# Patient Record
Sex: MALE | Race: WHITE | NOT HISPANIC OR LATINO
[De-identification: names, ages, dates, MRNs, and addresses within clinical notes are randomized per-mention and may not be internally consistent; named-entity substitution may affect disease eponyms.]

---

## 2024-02-20 ENCOUNTER — NON-APPOINTMENT (OUTPATIENT)
Age: 44
End: 2024-02-20

## 2024-04-28 ENCOUNTER — NON-APPOINTMENT (OUTPATIENT)
Age: 44
End: 2024-04-28

## 2024-05-16 PROBLEM — Z00.00 ENCOUNTER FOR PREVENTIVE HEALTH EXAMINATION: Status: ACTIVE | Noted: 2024-05-16

## 2024-05-29 ENCOUNTER — APPOINTMENT (OUTPATIENT)
Dept: ORTHOPEDIC SURGERY | Facility: CLINIC | Age: 44
End: 2024-05-29
Payer: COMMERCIAL

## 2024-05-29 ENCOUNTER — RESULT CHARGE (OUTPATIENT)
Age: 44
End: 2024-05-29

## 2024-05-29 DIAGNOSIS — M23.92 UNSPECIFIED INTERNAL DERANGEMENT OF LEFT KNEE: ICD-10-CM

## 2024-05-29 DIAGNOSIS — M75.52 BURSITIS OF LEFT SHOULDER: ICD-10-CM

## 2024-05-29 DIAGNOSIS — M85.89 OTHER SPECIFIED DISORDERS OF BONE DENSITY AND STRUCTURE, MULTIPLE SITES: ICD-10-CM

## 2024-05-29 DIAGNOSIS — M75.51 BURSITIS OF RIGHT SHOULDER: ICD-10-CM

## 2024-05-29 PROCEDURE — 99213 OFFICE O/P EST LOW 20 MIN: CPT

## 2024-05-29 PROCEDURE — 73560 X-RAY EXAM OF KNEE 1 OR 2: CPT | Mod: LT

## 2024-05-29 PROCEDURE — 73030 X-RAY EXAM OF SHOULDER: CPT | Mod: 50

## 2024-05-29 NOTE — DISCUSSION/SUMMARY
[de-identified] : PATIENT IS HERE FOR BOTH SHOULDERS WITH THE LEFT SIDE BEING WORSE THAN THE RIGHT AND HIS LEFT KNEE.  HE DID HAVE SUPARTZ INJECTION IN A DIFFERENT ORTHOPEDIC PRACTICE SAID WAS FOR ARTHRITIS AND THEY DID DO AN MRI BUT WE DO NOT HAVE THAT RESULT.  HE RECEIVED 5 SHOTS AND HE DOES FEEL BETTER BUT HE SEEKS SOME REASSURANCE BECAUSE THE OTHER PRACTICE SAID THAT IF THIS DID NOT WORK THEN THE NECK STEP WOULD BE SURGERY..  HIS OTHER MAIN PROBLEM IS HIS LEFT SHOULDER.  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  HE LIKES TO BOWL BUT HAS HAD DIFFICULTY DOING SO AT THE HIGH LEVEL THAT HE USUALLY BOWLS AT BECAUSE WHEN HE HAS TO DO AND ON HIS LEFT FOOT HIS KNEE HURTS.  HE DOES NOT SMOKE.  HE IS TAKING MELOXICAM WHICH WAS FOR HIS KNEE.  X-RAYS OF BOTH SHOULDERS TAKEN IN OUR OFFICE TODAY 3 VIEWS SHOW CHANGES CONSISTENT WITH A DOWNSLOPING ACROMION AND BEING ON THE CONTINUING AND SPECTRUM OF ROTATOR CUFF PATHOLOGY..  X-RAYS TAKEN TODAY IN OUR OFFICE STANDING OF HIS LEFT KNEE DO NOT SHOW SIGNIFICANT DJD OR OTHER LESIONS.  WE SPENT A LONG TIME EXPLAINING HIS PROBLEMS TO HIM AND THAT IF HIS LEFT KNEE DID NOT IMPROVE ENOUGH AND IF OTHER ANTI-INFLAMMATORIES DID NOT WORK OR OTHER INJECTIONS DID NOT WORK DEPENDING ON MRI FINDINGS HE MIGHT BENEFIT FROM AN ARTHROSCOPY BUT NO MAJOR SURGERY.  WE FELT THAT THIS SHOULD REASSURE HIM AND VOCALIZE THAT.  HE DID NOT REALLY AGREE OR SAID IT DID NOT HELP MUCH.  WE SPENT MORE TIME EXPLAINING THE CONTINUUM OF ROTATOR CUFF PATHOLOGY AND THE CONTINUUM OF TREATMENT TO INCLUDE A DIFFERENT ANTI-INFLAMMATORY AND I WAS GOING TO USE DOLOBID AS WELL AS PRESCRIPTION STRENGTH TYLENOL LIDODERM PATCHES VOLTAREN GEL AND CALCIUM AND VITAMIN D SUPPLEMENTATION.  WE ALSO DISCUSSED PHYSICAL THERAPY FOR HIS SHOULDERS AS WELL AS HIS LEFT KNEE AS WELL AS GENTLE AEROBIC CONDITIONING.  WE DISCUSSED SENDING HIM FOR AN MRI OF HIS LEFT SHOULDER.  HE DID NOT SEEM PLEASED WITH OUR PLAN SO I ASKED HIM WHAT IF ANYTHING HE LIKED ABOUT OUR VISIT.  HE SAID THAT HE DID NOT LIKE ANYTHING AND THAT HE CAME HERE TO BE MADE ALL BETTER AND THAT HE DID NOT DESERVE TO HAVE THESE PROBLEMS.  I EXPLAINED THAT WE TRIED TO GIVE HIM SOME COMFORT THAT HE WOULD NOT NEED MAJOR SURGERY ON HIS KNEE AND THAT WE HAD A PLAN TO MAKE BOTH HIS KNEE AND HIS SHOULDERS BETTER..  I REMINDED HIM THAT IT IS NOT UNREASONABLE EXPECTATION TO COME HERE ONCE AND FOR ME TO STOP MY FINGERS AND MAKE EVERYTHING GOOD RIGHT AWAY.  I PRESCRIBED EVERYTHING ON OUR DISCHARGE SHE BUT WHEN HE CAME UP TO OUR MEDICAL ASSISTANTS FOR DISCHARGE HE SAID THAT SHE SHOULD HOLD EVERYTHING INCLUDING THE MRI AS THE THERAPIES AND THE MEDICINES.  I WAS IN THE ADJOINING OFFICE WITH THE DOOR CLOSED BUT I CLEARLY HEARD BACK AND SHE CONFIRMS THE SAME. I CALLED HIM WITH THE GOAL OF THE ASKING HIM IF HE WERE DEPRESSED AND OTHERWISE MIGHT NEED REFERRAL FOR THAT BUT HE SAID THAT HE FELT THAT HE WAS "PLAYED" AND THAT HE DID NOT WANT TO SPEAK NOW AND THEN HE PUT ME ON A PERMANENT HOLD WITH SOME MUSIC IN THE BACKGROUND. AS REGARDS HIM BEING "PLAYED, "HE GOT HERE AN HOUR EARLIER THAN HIS APPOINTMENT AND WE DID REGISTER HIM AND SEND HIM FOR X-RAYS AND SEE HIM AS QUICKLY AS POSSIBLE AND WELL BEFORE HIS APPOINTMENT TIME.  HE DOES NOT DEMONSTRATE ANY VIOLENT TENDENCIES OR TENDENCY TO HURT HIMSELF HE IS WALKING STABLY AND IS ALERT AND ORIENTED X 3 WITHOUT ANY EVIDENCE OF ALCOHOL USE OR SUBSTANCE USE WE ARE NOT CAN OTHERWISE REFER HIM TO MENTAL HEALTH OR OTHER AUTHORITIES WITHOUT HIS PERMISSION AS HE GIVES NO INDICATION OF BEING A DANGER TO HIMSELF OR OTHERS.  WE HAD GIVEN HIM A 2-MONTH FOLLOW-UP AND IF HE CHOOSES TO FOLLOW-UP WE WILL BE GLAD TO SEE HIM AND IF HE CALLS IN AND HE WANTS THE TREATMENTS PRESCRIBED WE WILL DO SO.  OVERALL PHYSICAL EXAM GENERAL: Well developed well nourished in no acute distress   MENTAL:Alert and oriented x3, normal affect, Pleasant and accompanied by family   MUSCULOSKELETAL: Ambulating independently   HEENT: NCAT, EOM intact, mucosa moist, neck supple with adequate free rom   CARDIOVASCULAR/HEMATOLOGICAL: no JVD, no peripheral edema, good capillary refill,  skin warm and well perfused, no venous stasis ulcers, pulses intact, no pallor or superficial non-traumatic bruising   NEUROLOGICAL: free passive rom without rigidity or cog wheel motion   RESPIRATORY: no gross stridor or wheezing or increased respiratory effort or use of O2, good capil refill and color   INTEGUMENTARY: skin intact without obvious suspicious lesions where examined Bilateral SHOULDER EXAM No  postural asymmetry, no scapular winging, NVI   ROM: abduction>90; FF>90, IR to opposite shoulder, ER to back of head, adduction 30 but with discomfort at terminal range of motion  Impingement:  positive NEER with forearm in pronation and supination, positive Tian-Salas, positive  empty can test for pain by anterior and lateral aspect of shoulder near acromion and AC joint on affected side and virtually negative on contralateral side    Speed and [place] and subscapular lift off test positive for discomfort but they are able to do the motions against mild resistance location:      Left KNEE EXAM There range of motion is preserved.  They are slightly uncomfortable at the patellofemoral joint with compression and with resisted straight leg raising.  There is a very mild effusion and they do have joint line tenderness medial greater than lateral.  Anterior and posterior drawer are negative.  The knee is stable to varus valgus stress there neurovascular intact without signs or symptoms of DVT

## 2024-06-10 ENCOUNTER — APPOINTMENT (OUTPATIENT)
Dept: PULMONOLOGY | Facility: CLINIC | Age: 44
End: 2024-06-10
Payer: COMMERCIAL

## 2024-06-10 VITALS
BODY MASS INDEX: 35.79 KG/M2 | DIASTOLIC BLOOD PRESSURE: 80 MMHG | SYSTOLIC BLOOD PRESSURE: 120 MMHG | WEIGHT: 250 LBS | HEART RATE: 88 BPM | OXYGEN SATURATION: 96 % | HEIGHT: 70 IN

## 2024-06-10 DIAGNOSIS — G47.36 SLEEP RELATED HYPOVENTILATION IN CONDITIONS CLASSIFIED ELSEWHERE: ICD-10-CM

## 2024-06-10 DIAGNOSIS — G47.30 HYPERSOMNIA, UNSPECIFIED: ICD-10-CM

## 2024-06-10 DIAGNOSIS — G47.10 HYPERSOMNIA, UNSPECIFIED: ICD-10-CM

## 2024-06-10 PROCEDURE — 99203 OFFICE O/P NEW LOW 30 MIN: CPT

## 2024-06-10 PROCEDURE — G2211 COMPLEX E/M VISIT ADD ON: CPT | Mod: NC

## 2024-06-10 RX ORDER — PNV NO.95/FERROUS FUM/FOLIC AC 28MG-0.8MG
100 TABLET ORAL
Refills: 0 | Status: ACTIVE | COMMUNITY

## 2024-06-10 RX ORDER — UBIDECARENONE/VIT E ACET 100MG-5
CAPSULE ORAL
Refills: 0 | Status: ACTIVE | COMMUNITY

## 2024-06-10 RX ORDER — MONTELUKAST 10 MG/1
10 TABLET, FILM COATED ORAL
Refills: 0 | Status: ACTIVE | COMMUNITY

## 2024-06-10 NOTE — ASSESSMENT
[FreeTextEntry1] : Assessment: - Summary : Based on the patient's reported symptoms and physical examination findings, the provider suspects obstructive sleep apnea. - Problems : - Suspected obstructive sleep apnea  - Differential Diagnosis : - Obstructive sleep apnea is the most likely diagnosis based on the patient's symptoms and physical examination findings.  Plan: - Summary : The plan is to order a home sleep study to confirm the diagnosis of obstructive sleep apnea and initiate treatment with continuous positive airway pressure (CPAP) therapy if indicated. - Plan : - Order a home sleep study to document the presence and severity of sleep apnea.  - If sleep apnea is confirmed, prescribe a CPAP machine and provide education on its use and importance.  - Schedule a follow-up appointment within 90 days to assess treatment compliance and effectiveness.  - Emphasize the importance of using the CPAP machine for at least 4 hours per night, preferably 6-7 hours, to achieve optimal health benefits.  - Inform the patient that insurance coverage may be contingent on treatment compliance and follow-up. 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

## 2024-06-10 NOTE — HISTORY OF PRESENT ILLNESS
[TextBox_4] : Subjective: - Summary : Mr. Castellon is a patient presenting with concerns about his sleep quality and potential sleep apnea. - Chief Complaint (CC) : Evaluation of sleep quality and potential sleep apnea. - History of Present Illness (HPI) : Chief Complaint: Evaluation of sleep quality and potential sleep apnea.  Characterization of the Discomfort: Frequent bathroom trips during the night, feeling unrefreshed upon waking, occasional daytime sleepiness. Associated Signs and Symptoms: Snoring (reported by others), daytime sleepiness. Temporal Factors: Symptoms occur during sleep and upon waking. Associated Symptoms: Frequent bathroom trips during the night. Severity and Impact: Moderate impact on sleep quality and daytime functioning... .

## 2024-06-10 NOTE — PHYSICAL EXAM
[No Acute Distress] : no acute distress [Normal Appearance] : normal appearance [No Neck Mass] : no neck mass [Normal Rate/Rhythm] : normal rate/rhythm [Normal S1, S2] : normal s1, s2 [No Murmurs] : no murmurs [No Resp Distress] : no resp distress [Clear to Auscultation Bilaterally] : clear to auscultation bilaterally [No Abnormalities] : no abnormalities [Benign] : benign [Normal Gait] : normal gait [No Clubbing] : no clubbing [No Cyanosis] : no cyanosis [No Edema] : no edema [FROM] : FROM [No Focal Deficits] : no focal deficits [Normal Color/ Pigmentation] : normal color/ pigmentation [Oriented x3] : oriented x3 [Normal Affect] : normal affect

## 2024-06-20 ENCOUNTER — APPOINTMENT (OUTPATIENT)
Dept: SLEEP CENTER | Facility: HOSPITAL | Age: 44
End: 2024-06-20
Payer: COMMERCIAL

## 2024-06-20 ENCOUNTER — OUTPATIENT (OUTPATIENT)
Dept: OUTPATIENT SERVICES | Facility: HOSPITAL | Age: 44
LOS: 1 days | Discharge: HOME | End: 2024-06-20
Payer: COMMERCIAL

## 2024-06-20 DIAGNOSIS — G47.33 OBSTRUCTIVE SLEEP APNEA (ADULT) (PEDIATRIC): ICD-10-CM

## 2024-06-20 PROCEDURE — 95800 SLP STDY UNATTENDED: CPT | Mod: 26

## 2024-06-20 PROCEDURE — 95800 SLP STDY UNATTENDED: CPT

## 2024-06-25 DIAGNOSIS — G47.33 OBSTRUCTIVE SLEEP APNEA (ADULT) (PEDIATRIC): ICD-10-CM

## 2024-07-28 ENCOUNTER — NON-APPOINTMENT (OUTPATIENT)
Age: 44
End: 2024-07-28

## 2024-08-22 ENCOUNTER — APPOINTMENT (OUTPATIENT)
Dept: SLEEP CENTER | Facility: HOSPITAL | Age: 44
End: 2024-08-22

## 2024-08-22 PROCEDURE — 95811 POLYSOM 6/>YRS CPAP 4/> PARM: CPT | Mod: 26

## 2024-09-01 ENCOUNTER — EMERGENCY (EMERGENCY)
Facility: HOSPITAL | Age: 44
LOS: 0 days | Discharge: HOME | End: 2024-09-01
Attending: EMERGENCY MEDICINE
Payer: COMMERCIAL

## 2024-09-01 VITALS
WEIGHT: 220.02 LBS | DIASTOLIC BLOOD PRESSURE: 78 MMHG | SYSTOLIC BLOOD PRESSURE: 108 MMHG | HEART RATE: 82 BPM | RESPIRATION RATE: 18 BRPM | TEMPERATURE: 98 F | OXYGEN SATURATION: 98 %

## 2024-09-01 DIAGNOSIS — G89.29 OTHER CHRONIC PAIN: ICD-10-CM

## 2024-09-01 DIAGNOSIS — M25.562 PAIN IN LEFT KNEE: ICD-10-CM

## 2024-09-01 PROCEDURE — 99283 EMERGENCY DEPT VISIT LOW MDM: CPT

## 2024-09-01 PROCEDURE — 99284 EMERGENCY DEPT VISIT MOD MDM: CPT

## 2024-09-01 RX ORDER — DEXAMETHASONE 0.75 MG
10 TABLET ORAL ONCE
Refills: 0 | Status: COMPLETED | OUTPATIENT
Start: 2024-09-01 | End: 2024-09-01

## 2024-09-01 RX ORDER — OXYCODONE AND ACETAMINOPHEN 7.5; 325 MG/1; MG/1
1 TABLET ORAL
Qty: 16 | Refills: 0
Start: 2024-09-01 | End: 2024-09-04

## 2024-09-01 RX ORDER — IBUPROFEN 600 MG
600 TABLET ORAL ONCE
Refills: 0 | Status: COMPLETED | OUTPATIENT
Start: 2024-09-01 | End: 2024-09-01

## 2024-09-01 RX ADMIN — Medication 600 MILLIGRAM(S): at 18:11

## 2024-09-01 RX ADMIN — Medication 10 MILLIGRAM(S): at 18:11

## 2024-09-01 NOTE — ED PROVIDER NOTE - CLINICAL SUMMARY MEDICAL DECISION MAKING FREE TEXT BOX
Agree with above history exam explained that patient has no evidence of DVT on exam no evidence of infection and x-ray  likely to have any utility.  Given Percocet as breakthrough pain additional single dose of Decadron here stable for discharge outpatient follow-up.

## 2024-09-01 NOTE — ED PROVIDER NOTE - NSFOLLOWUPINSTRUCTIONS_ED_ALL_ED_FT
Our Emergency Department Referral Coordinators will be reaching out to you in the next 24-48 hours from 9:00am to 5:00pm with a follow up appointment. Please expect a phone call from the hospital in that time frame. If you do not receive a call or if you have any questions or concerns, you can reach them at   [phone]

## 2024-09-01 NOTE — ED ADULT TRIAGE NOTE - NS ED NURSE DIRECT TO ROOM YN
Refill request for DONEPEZIL HCL 10 MG Tablet,AMLODIPINE BESYLATE 10 MG Tablet   medication.      Name of [facility] visit - 7-     Pending visit - 1-    Last refill - 8-, 9-      Medication Contract signed -   Michael Tapia ran-         Additional Comments Yes

## 2024-09-01 NOTE — ED PROVIDER NOTE - OBJECTIVE STATEMENT
Patient is a 44-year-old male here for evaluation of atraumatic left knee pain over the past couple days status post embolization procedure on left knee done at [facility] 3 days ago.  Patient denies redness, swelling, fever, chills, trauma, weakness, numbness.  Patient states that he has chronic left knee pain and this feels similar to previous episodes,

## 2024-09-01 NOTE — ED PROVIDER NOTE - PATIENT PORTAL LINK FT
no You can access the FollowMyHealth Patient Portal offered by [facility] by registering at the following website: [email or web address]. By joining [facility]’s FollowMyHealth portal, you will also be able to view your health information using other applications (apps) compatible with our system.

## 2024-09-01 NOTE — ED ADULT TRIAGE NOTE - CHIEF COMPLAINT QUOTE
Pt complaining of pain in his left knee, says he had a GEA procedure done on friday and pain has worsened today. Denies injury to the knee

## 2024-09-01 NOTE — ED PROVIDER NOTE - PHYSICAL EXAMINATION
VITAL SIGNS: I have reviewed nursing notes and confirm.  CONSTITUTIONAL: Well-developed; well-nourished   SKIN: skin exam is warm and dry, no acute rash.    HEAD: Normocephalic; atraumatic.  EYES: conjunctiva and sclera clear.  ENT: No nasal discharge; airway clear.  EXT: no redness/swelling, warmth to left knee Normal ROM.  No clubbing, cyanosis or edema.   NEURO: Alert, oriented, grossly unremarkable

## 2024-09-20 ENCOUNTER — EMERGENCY (EMERGENCY)
Facility: HOSPITAL | Age: 44
LOS: 0 days | Discharge: HOME | End: 2024-09-20
Attending: EMERGENCY MEDICINE
Payer: COMMERCIAL

## 2024-09-20 VITALS
OXYGEN SATURATION: 99 % | TEMPERATURE: 98 F | WEIGHT: 259.93 LBS | DIASTOLIC BLOOD PRESSURE: 68 MMHG | SYSTOLIC BLOOD PRESSURE: 111 MMHG | HEART RATE: 98 BPM | RESPIRATION RATE: 18 BRPM

## 2024-09-20 DIAGNOSIS — M17.12 UNILATERAL PRIMARY OSTEOARTHRITIS, LEFT KNEE: ICD-10-CM

## 2024-09-20 DIAGNOSIS — M25.562 PAIN IN LEFT KNEE: ICD-10-CM

## 2024-09-20 PROCEDURE — 73562 X-RAY EXAM OF KNEE 3: CPT | Mod: LT

## 2024-09-20 PROCEDURE — 73562 X-RAY EXAM OF KNEE 3: CPT | Mod: 26,LT

## 2024-09-20 PROCEDURE — 99284 EMERGENCY DEPT VISIT MOD MDM: CPT

## 2024-09-20 PROCEDURE — 99283 EMERGENCY DEPT VISIT LOW MDM: CPT | Mod: 25

## 2024-09-20 NOTE — ED PROVIDER NOTE - CLINICAL SUMMARY MEDICAL DECISION MAKING FREE TEXT BOX
44-year-old male presents to the ED of left knee pain that started today.  Prior history of genicular artery embolization for left knee arthritis.  Followed up with surgeon who performed the procedure with no issues last week.  Today patient has pain to the left anterior knee.  Mild tenderness to palpation to the anterior portion of the knee.  No obvious skin changes.  5 out of 5 strength with knee flexion extension.  Distal pulses present.  Sensation is intact of the left lower extremity.  No groin site wound or hematoma.  X-ray unremarkable.  Consistent with arthritis/musculoskeletal related pain.  Will follow-up with physician who performed the procedure and primary care.  Discharged home.

## 2024-09-20 NOTE — ED PROVIDER NOTE - CHILD ABUSE FACILITY
[facility]
Introduction Text (Please End With A Colon): The following procedure was deferred:
Detail Level: Simple

## 2024-09-20 NOTE — ED PROVIDER NOTE - MUSCULOSKELETAL, MLM
left knee shows, slight swelling, FROM, no redness, no effusion, ambulating in ED without difficulty

## 2024-09-20 NOTE — ED ADULT TRIAGE NOTE - BIRTH SEX
DIAGNOSTIC HOME SLEEP TEST (HST) REPORT       PATIENT ID:  NAME:  Zuly Christian  MRN:               [id number]  YOB: 1950  DATE OF STUDY: 11/1/18      Impression:     This study shows evidence of:     1.ild obstructive sleep apnea with  Respiratory Event Index (ANKUSH) of 9.7 per hour and worse in supine sleep with ANKUSH at 24. These findings are based on the recording time (flow evaluation time). It is not possible with this device to determine a traditional apnea+hypopnea index (AHI) for total sleep time since EEG channels are not available.     2.  O2 Sat. rangel was 80% and baseline O2 at 96 %. O2 sat was below 89% for 201 min of the flow evaluation time. Avg HR 98 BPM.      TECHNICAL DESCRIPTION:  [employer] Device used was a type-III home study device. Home sleep study recording included: Airflow recording by nasal pressure transducer; Respiratory Effort by abdominal Respiratory Bands; O2 by finger oximetry. A position sensor and a snore channel was also used.    Scoring Criteria: A modification of the the AASM Manual for the Scoring of Sleep and Associated Events, 2012, was used.   Obstructive apnea was scored by cessation of airflow for at least 10 seconds with continuing respiratory effort.  Central apnea was scored by cessation of airflow for at least 10 seconds with no effort.  Hypopnea was scored by a 30% or more reduction in airflow for at least 10 seconds accompanied by an arterial oxygen desaturation of 3% or more.  (For Medicare patients, hypopneas were scored by a 30% or more reduction in airflow for at least 10 seconds accompanied by an arterial oxygen saturation of 4% or more, as required by their insurance, CMS.        General sleep summary: . Total recording time is 577 minutes and total flow evaluation time is 572 minutes. The patient spent 167 minutes in the supine position and 405 minutes in the nonsupine position.    Respiratory events:    Apneas: 33 (Obstructive apnea index  2.9/hr, Central apnea index 0.4 /hr, mixed 0.1 /hour)  Hypopneas: 60    Recommendations:    1. CPAP titration study vs Auto CPAP trial .   2.   In general patients with sleep apnea are advised to avoid alcohol and sedatives and to not operate a motor vehicle while drowsy. In some cases alternative treatment options may prove effective in resolving sleep apnea in these options include upper airway surgery, the use of a dental orthotic or weight loss and positional therapy. Clinical correlation is required.         Rodríguez Bustamante MD                                                   Male

## 2024-09-20 NOTE — ED PROVIDER NOTE - OBJECTIVE STATEMENT
patient c/o left knee pain past 2 days, no trauma, H/o GRA to left knee for arthritis in the past, Came for eval, no trauma

## 2024-09-20 NOTE — ED ADULT NURSE NOTE - NSFALLUNIVINTERV_ED_ALL_ED
Bed/Stretcher in lowest position, wheels locked, appropriate side rails in place/Call bell, personal items and telephone in reach/Instruct patient to call for assistance before getting out of bed/chair/stretcher/Non-slip footwear applied when patient is off stretcher/Divide to call system/Physically safe environment - no spills, clutter or unnecessary equipment/Purposeful proactive rounding/Room/bathroom lighting operational, light cord in reach

## 2024-09-20 NOTE — ED PROVIDER NOTE - WR INTERPRETATION DATE TIME  1
Aricept      Last Written Prescription Date: 04/14/17  Last Fill Quantity: 90,  # refills: 1   Last Office Visit with G, [facility] or [facility] prescribing provider: 10/14/17                                               
aricept  Last office visit: 10/4/17  Last refill: 4/14/17 #90 refills 1    Thank you.    
20-Sep-2024 16:43

## 2024-10-04 ENCOUNTER — APPOINTMENT (OUTPATIENT)
Dept: ORTHOPEDIC SURGERY | Facility: CLINIC | Age: 44
End: 2024-10-04
Payer: COMMERCIAL

## 2024-10-04 DIAGNOSIS — M23.92 UNSPECIFIED INTERNAL DERANGEMENT OF LEFT KNEE: ICD-10-CM

## 2024-10-04 PROCEDURE — 73560 X-RAY EXAM OF KNEE 1 OR 2: CPT | Mod: 50

## 2024-10-04 PROCEDURE — 99204 OFFICE O/P NEW MOD 45 MIN: CPT

## 2024-10-04 RX ORDER — CELECOXIB 200 MG/1
200 CAPSULE ORAL
Qty: 60 | Refills: 2 | Status: ACTIVE | COMMUNITY
Start: 2024-10-04 | End: 1900-01-01

## 2024-10-04 NOTE — ASSESSMENT
[FreeTextEntry1] : 44-year-old gentleman who has been worked up extensively.  By report and MRI showed mild knee arthritis.  Patient reports persistent anterior lateral and lateral knee pain.  My guess is that he most likely has some distal iliotibial band tendinitis.  He does not report any history of physical therapy.  It is ironic that a patient with knee pain that this would be the last thing that would be addressed.  My recommendation for this patient to be physical therapy focusing on iliotibial band stretching VMO strengthening generalized knee strengthening gait training and balance.  Modalities can utilize adjunct therapy.  I also think that his use of Celebrex is reasonable and probably can be continued.  Short of this is not much I can offer this patient.  I suspect that his expectations are out of proportion to his aspirations.  He had a 70% improvement with the geniculate artery embolization.  He has an MRI which documents early arthritis.  He carries approximately 25 to 30 pounds more weight than is ideal.    Recommendations: Physical therapy to teach the patient a self-directed exercise program.  Physical therapy will focus on terminal extension quadricep and VMO strengthening generalized knee strengthening exercises.  Physical therapy should also work on iliotibial band stretching and hip abductor strengthening.  Therapy can also teach the patient and work with the patient on core strengthening which will help with his generalized conditioning.  Modalities will be used as an adjunct but not a focus of therapy.  The second thing I would recommend would be continued use of nonsteroidal anti-inflammatory medication such as Celebrex.  Am happy to provide him with a new prescription today and then going forward he can get further prescriptions from his medical doctor.  Third I would recommend a referral to a nutritionist.  This could be carried out through his primary medical doctor.  He may be a candidate for semaglutide injection which would also help with weight loss in addition to addressing his borderline diabetes.  Please cc a copy of this note to Dr. Olga Maldonado.

## 2024-10-04 NOTE — HISTORY OF PRESENT ILLNESS
[de-identified] : 44-year-old patient presents this office for ongoing management of chronic left knee pain mostly over the lateral aspects of his knee.  Initially evaluated by Dr. Venkatesh Marks in May of this year he reports that his pain began about 9 months ago without any preceding event triggering the pain.  He was initially seen by an outside orthopedic group who are managing him with nonsteroidal anti-inflammatory medications which he found ineffective.  Patient was also worked up at that time with an MRI of the results which according to the patient demonstrated only some arthritis in his left knee without other pathology.  Patient reports that he was given a series of 5 Supartz injections which did not meaningfully improve his symptomatology.  On his own he went to a vascular surgeon where a nurse practitioner prescribed a geniculate artery embolization.  This has resulted in the 70% improvement of his pain.  He has not had any formal physical therapy.  He walks without a cane or assistive device.  He reports his pain is worse after prolonged sitting but has pain with any efforts to stand for long periods of time.  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  He does not walk with a cane.  Does not utilize a brace.  Recently was placed on Celebrex which was somewhat helpful.    He is looking for a cure for his remaining 30% of pain.  He reports the pain is a deep achy pain.  He is intrigued by the idea of paresthetic nature to his pain but does not report clear paresthesias.  Past medical history:  Denies all reports last medical follow-up in April.  Does not routinely take any medications for systemic medical conditions. Does note that he was recently diagnosed as a prediabetic but is not currently on medication for this condition.  He is scheduled for medical follow-up in the next month.  Medications: Celebrex  Allergies: NKDA  Social: Single, no cigarette history, no alcohol, no drug use Works as a salesman the [employer]

## 2024-10-04 NOTE — IMAGING
· Noted on admission, , , T bili 0 60, downtrended  · RUQ US not done, hepatitis panel non reactive [de-identified] : Pleasant early middle-aged gentleman walks into my office with no antalgia.  Physical examination: Reported weight 230 pounds reported height 5 9 calculated BMI 34 Left knee: Tenderness palpation over Areli's tubercle and over the lateral aspects of his knee.  This tenderness is localized distal to the lateral epicondyle.  There is no tenderness over the head of the fibula.  He has no tenderness along his joint line.  He really does not have any medial joint line tenderness or pain but with deep flexion passively he has pain in the posterior medial joint line.  Minimal synovial thickening.  No effusion.  Negative Apley's and Salena's test.  No varus valgus instability.  Negative Lachman test.  Minimally abnormal patellofemoral grind test.  No geniculate lymph nodes or masses.  Calf soft no cords.  Radiographs: Bilateral weightbearing knees (AP, lateral): No bony abnormalities.  Joint spaces are well-preserved.  No periarticular erosive changes.  No subchondral sclerotic or cystic changes.  No marginal osteophytes.  Patella demonstrates some mild [place].

## 2025-01-11 ENCOUNTER — NON-APPOINTMENT (OUTPATIENT)
Age: 45
End: 2025-01-11

## 2025-03-07 ENCOUNTER — APPOINTMENT (OUTPATIENT)
Dept: ORTHOPEDIC SURGERY | Age: 45
End: 2025-03-07
Payer: COMMERCIAL

## 2025-03-07 DIAGNOSIS — M06.9 RHEUMATOID ARTHRITIS, UNSPECIFIED: ICD-10-CM

## 2025-03-07 PROCEDURE — 73130 X-RAY EXAM OF HAND: CPT | Mod: 50

## 2025-03-07 PROCEDURE — 99204 OFFICE O/P NEW MOD 45 MIN: CPT

## 2025-03-07 RX ORDER — METHYLPREDNISOLONE 4 MG/1
4 TABLET ORAL
Qty: 1 | Refills: 0 | Status: ACTIVE | COMMUNITY
Start: 2025-03-07 | End: 1900-01-01

## 2025-03-14 ENCOUNTER — APPOINTMENT (OUTPATIENT)
Dept: NEUROLOGY | Facility: CLINIC | Age: 45
End: 2025-03-14
Payer: COMMERCIAL

## 2025-03-14 PROCEDURE — 95912 NRV CNDJ TEST 11-12 STUDIES: CPT

## 2025-03-14 PROCEDURE — 95886 MUSC TEST DONE W/N TEST COMP: CPT

## 2025-03-28 ENCOUNTER — APPOINTMENT (OUTPATIENT)
Facility: CLINIC | Age: 45
End: 2025-03-28

## 2025-04-04 ENCOUNTER — APPOINTMENT (OUTPATIENT)
Dept: ORTHOPEDIC SURGERY | Age: 45
End: 2025-04-04
Payer: COMMERCIAL

## 2025-04-04 DIAGNOSIS — M06.9 RHEUMATOID ARTHRITIS, UNSPECIFIED: ICD-10-CM

## 2025-04-04 DIAGNOSIS — G56.21 LESION OF ULNAR NERVE, RIGHT UPPER LIMB: ICD-10-CM

## 2025-04-04 PROCEDURE — 99214 OFFICE O/P EST MOD 30 MIN: CPT

## 2025-06-13 ENCOUNTER — APPOINTMENT (OUTPATIENT)
Dept: ORTHOPEDIC SURGERY | Age: 45
End: 2025-06-13
Payer: COMMERCIAL

## 2025-06-13 PROCEDURE — 20550 NJX 1 TENDON SHEATH/LIGAMENT: CPT

## 2025-06-13 PROCEDURE — 99214 OFFICE O/P EST MOD 30 MIN: CPT | Mod: 25

## 2025-07-18 ENCOUNTER — APPOINTMENT (OUTPATIENT)
Dept: ORTHOPEDIC SURGERY | Age: 45
End: 2025-07-18
Payer: COMMERCIAL

## 2025-07-18 PROCEDURE — 99213 OFFICE O/P EST LOW 20 MIN: CPT | Mod: 25

## 2025-07-18 PROCEDURE — 20550 NJX 1 TENDON SHEATH/LIGAMENT: CPT
